# Patient Record
Sex: FEMALE | Race: WHITE | NOT HISPANIC OR LATINO | Employment: UNEMPLOYED | URBAN - METROPOLITAN AREA
[De-identification: names, ages, dates, MRNs, and addresses within clinical notes are randomized per-mention and may not be internally consistent; named-entity substitution may affect disease eponyms.]

---
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
## 2022-03-17 ENCOUNTER — TELEPHONE (OUTPATIENT)
Dept: PEDIATRICS CLINIC | Age: 22
End: 2022-03-17

## 2022-05-10 ENCOUNTER — TELEPHONE (OUTPATIENT)
Dept: PEDIATRICS CLINIC | Age: 22
End: 2022-05-10

## 2022-06-07 ENCOUNTER — TELEPHONE (OUTPATIENT)
Dept: PEDIATRICS CLINIC | Age: 22
End: 2022-06-07

## 2022-06-08 NOTE — TELEPHONE ENCOUNTER
06/08/22 1:34 PM     Thank you for your request  Your request has been received, reviewed, and the patient chart updated  The PCP has successfully been removed with a patient attribution note  This message will now be completed      Thank you  Mai Cool

## 2022-07-28 NOTE — TELEPHONE ENCOUNTER
Called first number and unable to leave message  Called second number and left message  Asked for a call back tomorrow with list of current medications, so that they would be correct when she goes to GI 
Called first number and unable to leave message  Called second number and left message to ask for call back with list of medications that patient is currently taking  Advised that leaving office shortly and asked to call back and leave list with office staff tomorrow  Albin Camargo LPN, had tried to call pharmacy earlier but they could only give a list of filled medication and did not know exactly what she was taking 
Mom called back and gave Alba Multani updated medication list of current medications  Mom reports the only changes were that she is no longer taking guanfacine (Tenex) which I discontinued on her medication list   She is taking clonazepam 0 25 mg to 0 5 mg daily as needed  I am unable to change this medication in system but added note that is taking prn 
patient

## 2022-10-14 ENCOUNTER — CONSULT (OUTPATIENT)
Dept: NEUROLOGY | Facility: CLINIC | Age: 22
End: 2022-10-14
Payer: COMMERCIAL

## 2022-10-14 VITALS
WEIGHT: 197 LBS | DIASTOLIC BLOOD PRESSURE: 74 MMHG | HEIGHT: 65 IN | HEART RATE: 83 BPM | TEMPERATURE: 96.6 F | BODY MASS INDEX: 32.82 KG/M2 | SYSTOLIC BLOOD PRESSURE: 122 MMHG

## 2022-10-14 DIAGNOSIS — R68.89 SPELLS OF DECREASED ATTENTIVENESS: Primary | ICD-10-CM

## 2022-10-14 DIAGNOSIS — R56.9 SEIZURE-LIKE ACTIVITY (HCC): ICD-10-CM

## 2022-10-14 PROCEDURE — 99245 OFF/OP CONSLTJ NEW/EST HI 55: CPT | Performed by: STUDENT IN AN ORGANIZED HEALTH CARE EDUCATION/TRAINING PROGRAM

## 2022-10-14 NOTE — ASSESSMENT & PLAN NOTE
22 y/o f w h/o Psychogenic nonepileptic spells, depression, anxiety, Tourette's syndrome, hypersomnia, OCT, bulimia, anorexia presents here as a new patient for evaluation of her spells ( seizure type episodes)  To review, spells started July 2018, about 2 episodes per day, reports warning followed by jerky movements of her body (see HPI) sometimes semiconscious during the episodes, last 1-15 min, followed by being emotional sometimes crying, mild difference in episodes in last 6 months, slightly more altered after the episodes, last 3 weeks also reports she stops breathing during some of the episodes   Reviewed ambulatory EEG report in 2021, captured 2 of her typical spells, no electrographic correlation  Recent MRI shows similar small T2 hyperintensities as in past     Saw videos of her episodes-saw jerky movement of her whole body  No tonic clonic phase, no tongue bite, urine/bowel incontinence  Not on any meds  Impression- Given mild change in semiology of her spells in last 6 months we offered routine EEG followed by possible EMU admission  Concern of psychogenic non epileptic spells with these episodes as well as she says she remains conscious during some of these episodes and they vary in semiology sometimes       Plan-  Routine EEG   Follow up in 3 months   Call with any questions or concerns   Advised to not drive

## 2022-10-14 NOTE — PROGRESS NOTES
Patient ID: Larissa Asif is a 23 y o  female with Psychogenic nonepileptic spells, depression, anxiety, Tourette's syndrome, hypersomnia, OCT, bulimia, anorexia presents here as a new patient for evaluation of her spells ( seizure type episodes)  Assessment/Plan:    Spells of decreased attentiveness   22 y/o f w h/o Psychogenic nonepileptic spells, depression, anxiety, Tourette's syndrome, hypersomnia, OCT, bulimia, anorexia presents here as a new patient for evaluation of her spells ( seizure type episodes)  To review, spells started July 2018, about 2 episodes per day, reports warning followed by jerky movements of her body (see HPI) sometimes semiconscious during the episodes, last 1-15 min, followed by being emotional sometimes crying, mild difference in episodes in last 6 months, slightly more altered after the episodes, last 3 weeks also reports she stops breathing during some of the episodes   Reviewed ambulatory EEG report in 2021, captured 2 of her typical spells, no electrographic correlation  Recent MRI shows similar small T2 hyperintensities as in past     Saw videos of her episodes-saw jerky movement of her whole body  No tonic clonic phase, no tongue bite, urine/bowel incontinence  Not on any meds  Impression- Given mild change in semiology of her spells in last 6 months we offered routine EEG followed by possible EMU admission  Concern of psychogenic non epileptic spells with these episodes as well as she says she remains conscious during some of these episodes and they vary in semiology sometimes  Plan-  Routine EEG   Follow up in 3 months   Call with any questions or concerns   Advised to not drive         She will Return in about 3 months (around 1/14/2023)        Subjective:     22 y/o f w h/o Psychogenic nonepileptic spells, depression, anxiety, Tourette's syndrome, hypersomnia, OCT, bulimia, anorexia presents here as a new patient for evaluation of her spells ( seizure type episodes)  Patient reports she gets seizure type episodes that started in July 2018  She describes them as   initial warning-  Pressure in her head, difficulty focus, slurring her words,  Zones out-  Last few seconds to a minute  then she loses consciousness,  Eyes rolls back,  sometimes her body goes backwards  Lasts 1-15 min   Post episode She gasps and shoots back again, usually oriented to time place and person when she wakes up from the episode, sometime she feels emotional, panicky, anxiety, shaking and have difficulty typing after the episode  Usually comes back to her normal self within minutes  Frequency- 2-3/week    Denies-  Tongue bite, urine incontinence, bowel incontinence during these episodes   she was evaluated at [facility],  MRI brain was done which showed-  Tiny hyperintensities,  She underwent video EEG monitoring and they were able to capture 2 of her episodes which did not correlate on EEG so diagnosed to have PNES  She followed up with psychotherapy and was put on different meds for her mood which did not help with these episodes but did help her depression and anxiety     In last 6 months ( since April)-  Episodes of slightly different,  She sometimes does not get warning, she is more conscious during these episodes, she could hear people talking, she sometimes to twitch her neck, she sometimes describes these as " convulsing" denies shaking,  Has video which looks like twitching of her whole body,  She becomes tearful, crying during these episodes after the episode she is slightly disoriented after the episode  frequency 1-2 per day, few lasts more than 5 minutes  She did injure her head 1 time and had concussion  She did have few ED visits for these episodes       In last 3 weeks she describes similar episodes but associated with decreased breathing, she says she stops breathing during these episodes, she had 1 episode when she also had increase in her blood pressure during these episodes  Most recent episode- last night   Repeat MRI brain-  Shows T2 hyperintensities  No EEG  also completed Sleep study to rule out narcolepsy? diagnosed with Hypersomnia,  Prescribed midafinil  Stopped 6 months ago d/t side effects  Current seizure medications:  1   none  Other medications as per Epic    Event/Seizure semiology:   As above- gets warning, pressure in her head, slurring of words, zones out,  becomes semiconscious, sometimes aware what is going on however unable to formulate, sometimes loses consciousness, generalized jerking movement of her body, lasts few min, mild orientation sometimes  Recently in last 3 weeks she also some breathing issues where sometimes she stops breathing during these episodes  Special Features  Status epilepticus: no longerst 15 min   Self Injury Seizures: yes- convulsion   Precipitating Factors: lights, sounds, repeated lights, changes in temp, used to have stress     Epilepsy Risk Factors:  Abnormal pregnancy:    Her mom was told- possibly Down syndrome when pregnant but didn't have any chromosomal anomaly when she was born  Abnormal birth/:   no  Abnormal Development:   no  Febrile seizures, simple:   no  Febrile seizures, complex:   no  CNS infection:    no  Intellectual disability:    no  Cerebral palsy:    no  Head injury (moderate/severe):  no  CNS neoplasm:    no  CNS malformation:    no  Neurosurgical procedure:   no  Stroke:     no  Alcohol abuse:    nonone   Drug abuse:     no none   Family history Sz/epilepsy:   no no   Prior physical/sexual/emotional abuse: no    Prior AEDs:  =None      Prior Evaluation:  - MRI brain: 22- Several small foci of cerebral white matter signal abnormality which are   nonspecific in a patient of this age group and are therefore of uncertain   etiology and clinical significance   This finding has been reported in the   setting of chronic migraine headaches    - Routine EEG: unable to see the results     - Ambulatory EEG: Monitoring Period Start: 10/19/2021    Monitoring Period End: 10/20/2021    Overall Final Impression: Normal     This is a normal ambulatory EEG  Several episodes of eye fluttering associated episodes did not have clear epileptiform discharges  - Video EEG: none   - PET scan brain : none   - Neuropsychologic testing: none     Psychiatric History:  Depression: yes- diagnosed 17 y/2- tried zoloft, abilify, clonazepam, lexapro, stopped-  April 2nd 2022- didn't help her spells  Anxiety: JEN- diagnosis  17 y/o   Psychotherapies- June 2022- helpful  No time  Psychosis:  none  Psychiatric Admissions: none   OCD- diagnosed age-13- touch/balance  Saying things 3 times   SI- attmpted- twice- strangled her self, one time tried to cut her wrist     Medications-  None at this time  Social- full time student, [employer], [employer]  Lives with room mates, no neuro deficits at baseline  allerigies- latex, nuts   Doesn't drive  Her history was also obtained from her and her significant other in room  The following portions of the patient's history were reviewed and updated as appropriate: allergies, current medications, past family history, past medical history, past social history, past surgical history and problem list      Objective:    Blood pressure 122/74, pulse 83, temperature (!) 96 6 °F (35 9 °C), height 5' 5" (1 651 m), weight 89 4 kg (197 lb)  Physical Exam  Vitals reviewed  Constitutional:       Appearance: Normal appearance  HENT:      Head: Normocephalic  Mouth/Throat:      Mouth: Mucous membranes are moist       Pharynx: Oropharynx is clear  Eyes:      Extraocular Movements: Extraocular movements intact  Pupils: Pupils are equal, round, and reactive to light  Cardiovascular:      Rate and Rhythm: Normal rate  Pulses: Normal pulses  Pulmonary:      Effort: Pulmonary effort is normal    Abdominal:      Palpations: Abdomen is soft  Musculoskeletal:         General: Normal range of motion  Cervical back: Normal range of motion  Skin:     General: Skin is warm  Capillary Refill: Capillary refill takes less than 2 seconds  Neurological:      Mental Status: She is alert  Psychiatric:         Mood and Affect: Mood normal        Neurological Examination:     Mental Status: The patient was awake, alert, attentive, oriented to person, place, and time  Recent and remote memory intact to conversation with no evidence of language dysfunction  Cranial Nerves:   I: smell Not tested   II: visual fields Full to confrontation  Pupils equal, round, reactive to light with normal accomodation  Fundus: benign fundus  III,IV,VI: extraocular muscles EOMI, no nystagmus   V: masseter and pterygoid strength full  Sensation in the V1 through V3 distributions intact to pinprick and light touch bilaterally  VII: Face is symmetric with no weakness noted  VIII: Audition intact to finger rub bilaterally  IX/X: Uvula midline  Soft palate elevation symmetric  XI: Trapezius and SCM strength 5/5 B/L  XII: Tongue midline with no atrophy or fasciculations with appropriate movement  Motor Examination:   No pronator drift  Bulk: Normal  No atrophy Tone: Normal  Fasciculations: None        Deltoid Biceps Triceps WE   WF   FF IO     Right        5         5          5         5      5      5   5        Left           5        5          5          5      5     5   5                       IP        Quad   Ham     TA       Gastroc   Right      5            5          5         5                5  Left         5            5         5         5                5       Reflexes:                   Biceps Brachioradialis Triceps Patella Achilles Plantars   Right          2+            2+                  2+        2+       2+         Down   Left            2+             2+                 2+         2+       2+         Down     Clonus: None    Pathological Reflexes:  Hoffmans: negative  Babinsky: negative  Jaw Jerk: negative    Coordination: Patient able to perform normal finger-to-nose and heel to shin appropriately  Normal rapid alternating movements  Sensory: Normal sensation to light touch, pin prick and vibratory sensation throughout  Gait:normal stance and posture, normal stride length and arm swing, normal turn around  ROS:    Review of Systems   Constitutional: Negative  Negative for appetite change and fever  HENT: Negative  Negative for hearing loss, tinnitus, trouble swallowing and voice change  Eyes: Negative  Negative for photophobia, pain and visual disturbance  Respiratory: Positive for shortness of breath  Cardiovascular: Negative  Negative for palpitations  Gastrointestinal: Positive for nausea and vomiting  Endocrine: Negative  Negative for cold intolerance  Genitourinary: Negative  Negative for dysuria, frequency and urgency  Musculoskeletal: Negative  Negative for gait problem, myalgias and neck pain  Skin: Negative  Negative for rash  Allergic/Immunologic: Negative  Neurological: Positive for dizziness, tremors, seizures, speech difficulty, weakness, light-headedness and headaches  Negative for syncope, facial asymmetry and numbness  Hematological: Negative  Does not bruise/bleed easily  Psychiatric/Behavioral: Positive for confusion  Negative for hallucinations and sleep disturbance  Memory   All other systems reviewed and are negative      I personally reviewed the ROS that was entered by the medical assistant

## 2022-10-14 NOTE — PROGRESS NOTES
Review of Systems   Constitutional: Negative  Negative for appetite change and fever  HENT: Negative  Negative for hearing loss, tinnitus, trouble swallowing and voice change  Eyes: Negative  Negative for photophobia, pain and visual disturbance  Respiratory: Positive for shortness of breath  Cardiovascular: Negative  Negative for palpitations  Gastrointestinal: Positive for nausea and vomiting  Endocrine: Negative  Negative for cold intolerance  Genitourinary: Negative  Negative for dysuria, frequency and urgency  Musculoskeletal: Negative  Negative for gait problem, myalgias and neck pain  Skin: Negative  Negative for rash  Allergic/Immunologic: Negative  Neurological: Positive for dizziness, tremors, seizures, speech difficulty, weakness, light-headedness and headaches  Negative for syncope, facial asymmetry and numbness  Hematological: Negative  Does not bruise/bleed easily  Psychiatric/Behavioral: Positive for confusion  Negative for hallucinations and sleep disturbance  Memory   All other systems reviewed and are negative

## 2022-11-17 ENCOUNTER — HOSPITAL ENCOUNTER (OUTPATIENT)
Dept: NEUROLOGY | Facility: HOSPITAL | Age: 22
End: 2022-11-17

## 2022-11-17 DIAGNOSIS — R68.89 SPELLS OF DECREASED ATTENTIVENESS: ICD-10-CM

## 2022-11-21 ENCOUNTER — TELEPHONE (OUTPATIENT)
Dept: NEUROLOGY | Facility: CLINIC | Age: 22
End: 2022-11-21

## 2022-11-21 DIAGNOSIS — R56.9 SEIZURE-LIKE ACTIVITY (HCC): Primary | ICD-10-CM

## 2022-11-21 NOTE — TELEPHONE ENCOUNTER
----- Message from Russell Negrete sent at 11/21/2022  7:46 AM EST -----  Regarding: FW: EMU Scheduling  Contact: [phone]    ----- Message -----  From: Shaniqua Parker  Sent: 11/18/2022   6:22 PM EST  To: Neurology [facility]  Subject: Eber Mood Dr Yayo Latif,    I had my basic EEG done yesterday at the [facility] location  The results cane back "abnormal "  How and when can I schedule my visit with the EMU?     Please and thank you,  Linden Velazquez

## 2022-11-25 NOTE — TELEPHONE ENCOUNTER
Called pt to schedule EMU admission  EMU admission scheduled for 12/19/2022 at 09:00 with Dr Beth Morales attending  Education sheet reviewed with pt and emailed to her  Pt added to outlook calendar  ADT21 Order placed   Encounter forwarded to ordering physician and Pre-cert team

## 2022-11-28 NOTE — TELEPHONE ENCOUNTER
Call placed to patient  Unable to move EMU admission to week of 12/12 as she will still be in school

## 2022-12-19 ENCOUNTER — HOSPITAL ENCOUNTER (INPATIENT)
Facility: HOSPITAL | Age: 22
LOS: 2 days | Discharge: HOME | End: 2022-12-21
Attending: PSYCHIATRY & NEUROLOGY | Admitting: PSYCHIATRY & NEUROLOGY

## 2022-12-19 ENCOUNTER — APPOINTMENT (OUTPATIENT)
Dept: NEUROLOGY | Facility: CLINIC | Age: 22
End: 2022-12-19

## 2022-12-19 DIAGNOSIS — F44.5 FUNCTIONAL NEUROLOGICAL SYMPTOM DISORDER WITH ATTACKS OR SEIZURES: Primary | ICD-10-CM

## 2022-12-19 DIAGNOSIS — F44.5 PSYCHOGENIC NONEPILEPTIC SEIZURE: ICD-10-CM

## 2022-12-19 DIAGNOSIS — F42.2 MIXED OBSESSIONAL THOUGHTS AND ACTS: ICD-10-CM

## 2022-12-19 PROBLEM — F50.81 BINGE EATING DISORDER: Status: ACTIVE | Noted: 2022-09-02

## 2022-12-19 PROBLEM — R68.89 SPELLS OF DECREASED ATTENTIVENESS: Status: RESOLVED | Noted: 2022-10-14 | Resolved: 2022-12-19

## 2022-12-19 LAB
ALBUMIN SERPL BCP-MCNC: 3.9 G/DL (ref 3.5–5)
ALP SERPL-CCNC: 58 U/L (ref 46–116)
ALT SERPL W P-5'-P-CCNC: 29 U/L (ref 12–78)
ANION GAP SERPL CALCULATED.3IONS-SCNC: 4 MMOL/L (ref 4–13)
AST SERPL W P-5'-P-CCNC: 17 U/L (ref 5–45)
ATRIAL RATE: 72 BPM
BASOPHILS # BLD AUTO: 0.03 THOUSANDS/ÂΜL (ref 0–0.1)
BASOPHILS NFR BLD AUTO: 0 % (ref 0–1)
BILIRUB SERPL-MCNC: 0.47 MG/DL (ref 0.2–1)
BUN SERPL-MCNC: 11 MG/DL (ref 5–25)
CALCIUM SERPL-MCNC: 9.5 MG/DL (ref 8.3–10.1)
CHLORIDE SERPL-SCNC: 108 MMOL/L (ref 96–108)
CO2 SERPL-SCNC: 26 MMOL/L (ref 21–32)
CREAT SERPL-MCNC: 0.8 MG/DL (ref 0.6–1.3)
EOSINOPHIL # BLD AUTO: 0.03 THOUSAND/ÂΜL (ref 0–0.61)
EOSINOPHIL NFR BLD AUTO: 0 % (ref 0–6)
ERYTHROCYTE [DISTWIDTH] IN BLOOD BY AUTOMATED COUNT: 11.3 % (ref 11.6–15.1)
GFR SERPL CREATININE-BSD FRML MDRD: 106 ML/MIN/1.73SQ M
GLUCOSE SERPL-MCNC: 90 MG/DL (ref 65–140)
HCG SERPL QL: NEGATIVE
HCT VFR BLD AUTO: 40.5 % (ref 34.8–46.1)
HGB BLD-MCNC: 13.5 G/DL (ref 11.5–15.4)
IMM GRANULOCYTES # BLD AUTO: 0.03 THOUSAND/UL (ref 0–0.2)
IMM GRANULOCYTES NFR BLD AUTO: 0 % (ref 0–2)
LYMPHOCYTES # BLD AUTO: 1.51 THOUSANDS/ÂΜL (ref 0.6–4.47)
LYMPHOCYTES NFR BLD AUTO: 22 % (ref 14–44)
MCH RBC QN AUTO: 29.9 PG (ref 26.8–34.3)
MCHC RBC AUTO-ENTMCNC: 33.3 G/DL (ref 31.4–37.4)
MCV RBC AUTO: 90 FL (ref 82–98)
MONOCYTES # BLD AUTO: 0.41 THOUSAND/ÂΜL (ref 0.17–1.22)
MONOCYTES NFR BLD AUTO: 6 % (ref 4–12)
NEUTROPHILS # BLD AUTO: 4.83 THOUSANDS/ÂΜL (ref 1.85–7.62)
NEUTS SEG NFR BLD AUTO: 72 % (ref 43–75)
NRBC BLD AUTO-RTO: 0 /100 WBCS
P AXIS: 20 DEGREES
PLATELET # BLD AUTO: 335 THOUSANDS/UL (ref 149–390)
PMV BLD AUTO: 9.2 FL (ref 8.9–12.7)
POTASSIUM SERPL-SCNC: 4.3 MMOL/L (ref 3.5–5.3)
PR INTERVAL: 118 MS
PROT SERPL-MCNC: 7.9 G/DL (ref 6.4–8.4)
QRS AXIS: 63 DEGREES
QRSD INTERVAL: 88 MS
QT INTERVAL: 384 MS
QTC INTERVAL: 420 MS
RBC # BLD AUTO: 4.51 MILLION/UL (ref 3.81–5.12)
SODIUM SERPL-SCNC: 138 MMOL/L (ref 135–147)
T WAVE AXIS: 51 DEGREES
VENTRICULAR RATE: 72 BPM
WBC # BLD AUTO: 6.84 THOUSAND/UL (ref 4.31–10.16)

## 2022-12-19 RX ORDER — ETONOGESTREL/ETHINYL ESTRADIOL .12-.015MG
RING, VAGINAL VAGINAL
COMMUNITY
Start: 2022-10-26

## 2022-12-19 RX ORDER — LORAZEPAM 2 MG/ML
2 INJECTION INTRAMUSCULAR EVERY 8 HOURS PRN
Status: DISCONTINUED | OUTPATIENT
Start: 2022-12-19 | End: 2022-12-21 | Stop reason: HOSPADM

## 2022-12-19 RX ORDER — ONDANSETRON 2 MG/ML
4 INJECTION INTRAMUSCULAR; INTRAVENOUS EVERY 6 HOURS PRN
Status: DISCONTINUED | OUTPATIENT
Start: 2022-12-19 | End: 2022-12-21 | Stop reason: HOSPADM

## 2022-12-19 RX ADMIN — INFLUENZA VIRUS VACCINE 0.5 ML: 15; 15; 15; 15 SUSPENSION INTRAMUSCULAR at 12:58

## 2022-12-19 RX ADMIN — IBUPROFEN, ACETAMINOPHEN 2 TABLET: 125; 250 TABLET, FILM COATED ORAL at 19:39

## 2022-12-19 NOTE — H&P
Neurology/Epilepsy Admission Note  Patient Name:  Corin Espinoza  : 2000  MRN: [id number]  Encounter: [id number]  Date: 22  Bed / Unit: [facility] 715/[facility] 715-01    Referring Provider:  Royce Betancur MD    Chief Complaint: daily events concerning for seizures / loss of awareness with abnormal movements  HPI:   Ms Corin Espinoza is a 23 y o  right handed woman referred to the epilepsy monitoring unit to evaluate for recurrent episodes with seizure-like activity  These events involve loss of consciousness with upper body twitching, jerking, or muscle spasm type of activity  She was previously diagnosed with PNES events in 2018 at [facility]  She was having episodes of short spells of unconsciousness, less than a minute, there was no convulsive activity, but she had no warning to these events  She did not complete an epilepsy monitoring unit at the time  She had an ambulatory EEG study done  Later, the PNES diagnosis was removed because the doctors thought maybe she had narcolepsy instead  She had a sleep study that did not confirm narcolepsy or cataplexy, but she had hypersomnolence  For the past year, maybe starting in the  of , her events changed, have become more prolonged and she has some degree of consciousness  These events start with a change in her speech (word finding difficulty, slurred speech, or slowness in speech), then she feels paralyzed unable to respond, or she looks like she is staring, then she has upper body (neck and shoulders) muscle spasms, neck arches or flexes forward, shoulder seems to be twitching, these muscle spasms are so severe she cannot breath, she may let out a gasp just to take a breath  She is sometimes conscious  These events last 4-10 minutes  There is no movement of her legs or hands  After she is done with these events, she has a severe headache and muscle pain around her neck and shoulders   She has to take a specific ibuprofen 125mg-acetaminophen 250mg tab 2 tabs after the event because of the severity of her headache and muscle pain  These events happen about twice a day, mostly around 1PM to 6PM   These can be triggered by flashing lights, rapid temperature changes, or if she is exposed to too much auditory stimuli  She had an EEG study that showed abnormal bilateral frontotemporal theta activity  Current AEDs:  none    Event/Seizure semiology:  1  Altered speech, neck arching/flexing, shoulder spasms or twitching, eyes rolling up, difficulty breathing, altered awareness    Special Features  Status epilepticus: No  Self Injury Seizures: No  Precipitating Factors: flashing lights, too much auditory stimulation  Post-ictal State: headache and muscle pain    Seizure Risk Factors:  Abnormal pregnancy: No  Abnormal birth/: No  Abnormal Development: No  Febrile seizures, simple: No  Febrile seizures, complex: No  CNS infection: No  Intellectual disability: No  Cerebral palsy: No  Head injury (moderate/severe): No  CNS neoplasm: No  CNS malformation: No  Neurosurgical procedure: No  Stroke: No  Alcohol abuse: No  Drug abuse: No  Family history Sz/epilepsy: No    Prior AEDs:  none    Past Medical/Surgical History:  Past Medical History:   Diagnosis Date   • Allergic rhinitis    • Childhood asthma    • Eczema    • Folliculitis    • Functional murmur    • GERD (gastroesophageal reflux disease)    • Parapsoriasis    • Seizures ([facility] [address] )     [facility] 2018 - PNES   • Tourette's      Past Surgical History:   Procedure Laterality Date   • SKIN LESION EXCISION      removal of lesion       Psychiatric History:  Depression: Yes  Anxiety: Yes, OCD (feels like her skin is crawling or something is on it, bulimia; history of cutting behavior  Psychosis: No  Psychiatric Admissions: No    Current Medications (not AEDs): Ibuprofen 125mg-acetaminophen 250mg PRN headache/muscle pain  Nuvaring    Allergies:   Allergies   Allergen Reactions   • Treenut [Nuts - Food Allergy] Anaphylaxis   • Latex Rash and Blisters       Family history:  Family History   Problem Relation Age of Onset   • Allergies Mother         3-quinolones allergy and allergic to sulfa drugs and allergic reaction to penicillin   • Asthma Mother    • Migraines Mother    • Hypertension Mother    • Spondylolysis Mother    • OCD Mother    • Hypertension Father    • Allergies Father         nuts, tomato, apples, banana, kiwi, pears, peaches   • Asthma Father    • Hyperlipidemia Father    • Esophagitis Father         Eosinophilic   • Migraines Brother    • Asthma Brother    • Other Brother         hypoglycemia   • Hyperlipidemia Maternal Grandmother    • Hypertension Maternal Grandmother    • Migraines Maternal Grandmother    • Gallbladder disease Maternal Grandmother         removed gall bladder   • Hypertension Maternal Grandfather    • Arthritis Maternal Grandfather    • Hypertension Paternal Grandmother    • Hypertension Paternal Grandfather    • Depression Paternal Grandfather    • Pancreatic cancer Paternal Grandfather    • Heart attack Paternal Grandfather    • Gallbladder disease Maternal Aunt    • Ulcers Maternal Aunt         peptic ulcer   • Thyroid disease Family         MGGM   • Alcohol abuse Neg Hx    • Substance Abuse Neg Hx        Social History:  College student; vocal major  Tobacco:  No tobacco use   Alcohol:  No alcohol use      Drugs:  No illegal drug use    Review of Systems  ROS:  (Extended=2-9; brief = 1)    Constitutional: negative  Eyes: negative  Ears, nose, mouth, throat, and face: sore throat / hoarseness after signing  Respiratory: negative  Cardiovascular: negative  Gastrointestinal: negative  Genitourinary:negative  Integument/breast: negative  Hematologic/lymphatic: positive for easy bruising  Musculoskeletal:both her knees have joint pain, it has been there all her life  Neurological: negative  Behavioral/Psych: positive for anxiety, irritability and anxiety and tics    Physical Exam:  Blood pressure 117/77, pulse 84, temperature (!) 97 3 °F (36 3 °C), temperature source Oral, resp  rate 16, height 5' 3" (1 6 m), weight 88 9 kg (195 lb 15 8 oz), SpO2 96 %     Appearance: normally developed, appears well  Cardiovascular: regular rate and rhythm and normal heart sounds  Pulmonary: clear to auscultation  Abdominal: soft, obese  Extremities: no edema    Carotids: No bruits  HEENT: anicteric and moist mucus membranes / oral cavity   Fundoscopy: normal    Mental status  Orientation: alert and oriented to name, place, time  Fund of Knowledge: intact   Attention and Concentration: intact  Current and Remote Memory:intact  Language: spontaneous speech is normal and comprehension is intact    Cranial Nerves  CN 1: not tested  CN 2: Visual fields intact to confrontation and pupils equal round reactive to direct and consenual light   CN 3, 4, 6: EOMI, no nystagmus  CN 5:sensation intact to all distribution V1, V2, V3  CN 7:muscles of facial expression are symmetric  CN 8:symmetric to finger rubs bilaterally  CN 9, 10:symmetric elevation of soft palate and uvula is midline  CN 11:symmetric strength of sternocleidomastoid and trapezius muscles  CN 12:tongue is midline    Motor:  Bulk, Tone: normal bulk, normal tone  Pronation: no pronator drift  Strength: Patient has full strength symmetrically of shoulder abduction, biceps, triceps, wrist flexion, wrist extension, finger flexion, finger abduction, hip flexion, knee flexion, knee extension, dorsiflexion  Abnormal movements: very infrequent facial tic, grimacing movements    Sensory:  Lighttouch: intact in all limbs  Romberg:normal    Coordination:  FNF:FNF bilaterally intact  JOHN:intact  FFM:intact  Gait/Station:normal gait and normal tandem gait    Reflexes:  reflexes are uniformly hyporeflexic    [x] I personally reviewed the radiological images  Labs:  10/6/2022   /4 2/106/24/10/0 8/97  CBC  8 7/13 7/423/398    Radiology:  9/7/2022 - MRI brain   On my interpretation  This is mostly a normal MR brain study with symmetric hippocampal formations, no abnormal signal   There are no cortical lesions  There are several small punctate T2 hyperintensities in the subcortical white matter    Prior EEGs:  11/17/2022 - (Dr Cliff Murillo)  "dysrhythmic activities in the theta frequencies emanating from the frontotemporal head regions bilaterally"  During photic stimulation patient had a "spell" (loss of consciousness); no abnormal electroencephalographic activity was noted       Assessment:  23 y o  female admitted to the epilepsy monitoring unit for differential diagnosis of recurrent episodes with seizure-like activity including change in speech, altered awareness, upper body tonic spasms that last for 4-10 minutes at a time  These events can be epileptic focal hypermotor seizures or nonepileptic psychogenic events  Focal hyperkinetic motor seizures are often difficult to diagnosis due to unusual behaviors and continuous video EEG monitoring is necessary for these type of seizures, however, psychogenic nonepileptic seizures can also mimic epileptic seizures  We need to complete continuous video EEG monitoring study as an inpatient as if these are seizures, then we would need to start antiseizure medications and seizure safety measures  She has Tourette's syndrome, with facial tics and OCD symptoms  She also has underlying generalized anxiety, depression and history of self cutting behaviors that can cause her to have functional neurological symptoms (psychogenic nonepileptic seizures)        PLAN:  Spells/Fit NOS, Seizures R56 9  start continuous video EEG monitoring with single lead ECG  EMU protocol for seizure safety, fall precautions, ictal cognitive testing, and acute seizure care with vital signs  DVT prophylaxis: ambulation, Venodynes  Seizure rescue: lorazepam 2mg IV if there is a generalized convulsion or more than 2 complex partial seizures within 12 hours Headache/muscle pain (post event)  - ibuprofen 125mg - acetaminophen 250mg take 2 tabs q8hrs PRN post event headache/muscle pain    Diet  - regular diet    Flu vaccine - she requested a flu vaccine during this admission  FULL CODE       [x]  The patient will be admitted for at least 2 midnights for the evaluation of seizures / seizure-like activity  The process for admission to the Epilepsy Monitoring Unit (EMU) was discussed with the patient at length  I explained to her that she would be admitted to the EMU in order to better characterize and localized the events s/he is having  I also explained that, during her EMU admission, medications will be tapered, patients are sleep deprived, and undergo other induction procedures (including hyperventilation, photic stimulation, exercise) in order to induce a seizure  This will allow me to safely gain additional information about the precise nature and localization of his/her events  I also explained to the patient that, although the average EMU stay is approximately 3-7 days, the length of stay may be shorter or longer, depending on the time needed to induce a seizure  Patients generally will have a peripheral IV, may be on telemetry, and have DVT prophylaxis that may include heparin or low-molecular weight heparin injections and venodynes  During the EMU, patients will be on continuous video EEG monitoring which may result in skin breakdown from the electrodes and chemicals used to keep the electrodes in place (usually treatment with antibacterial ointment is sufficient), and be restricted to the bed/room for prolonged periods of time  Due to the induction of seizures, the EMU is the best setting to offer safety and management of acute seizures, which cannot be monitored from the home setting    Risk of inducing seizures include injuries, death, recurrent seizures including status epilepticus and the requirement of rescue medications including use of anesthesia and mechanical ventilation to abort recurrent seizures  [x]  The patient is having medically uncontrolled seizures or clinical events, which poses a significant mortality and morbidity risk  Continuous monitoring will aid in diagnosis and determining best treatment options  If seizures are left untreated, there is a 10-20% annual morbidity and 2% annual mortality rate  Continuous monitoring is required to capture a sufficient number of clinical events or seizures, while medication are reduced or provoked, which increases the risk of severe seizures and at risk of seizures causing injury or status epilepticus  The EMU is set up to monitor and record EEGs during seizures and it includes nurses trained to provide first aid and patient safety during seizures  The full hospital team must be present to deal with these medical emergency situations should they arise, including nurses to document patient’s exam  There have been 13 reported deaths during various video-EEG monitoring procedures, so this is not a "low risk" service

## 2022-12-19 NOTE — PLAN OF CARE
Problem: PAIN - ADULT  Goal: Verbalizes/displays adequate comfort level or baseline comfort level  Description: Interventions:  - Encourage patient to monitor pain and request assistance  - Assess pain using appropriate pain scale  - Administer analgesics based on type and severity of pain and evaluate response  - Implement non-pharmacological measures as appropriate and evaluate response  - Consider cultural and social influences on pain and pain management  - Notify physician/advanced practitioner if interventions unsuccessful or patient reports new pain  Outcome: Progressing     Problem: INFECTION - ADULT  Goal: Absence or prevention of progression during hospitalization  Description: INTERVENTIONS:  - Assess and monitor for signs and symptoms of infection  - Monitor lab/diagnostic results  - Monitor all insertion sites, i e  indwelling lines, tubes, and drains  - Monitor endotracheal if appropriate and nasal secretions for changes in amount and color  - Minerva appropriate cooling/warming therapies per order  - Administer medications as ordered  - Instruct and encourage patient and family to use good hand hygiene technique  - Identify and instruct in appropriate isolation precautions for identified infection/condition  Outcome: Progressing  Goal: Absence of fever/infection during neutropenic period  Description: INTERVENTIONS:  - Monitor WBC    Outcome: Progressing     Problem: SAFETY ADULT  Goal: Patient will remain free of falls  Description: INTERVENTIONS:  - Educate patient/family on patient safety including physical limitations  - Instruct patient to call for assistance with activity   - Consult OT/PT to assist with strengthening/mobility   - Keep Call bell within reach  - Keep bed low and locked with side rails adjusted as appropriate  - Keep care items and personal belongings within reach  - Initiate and maintain comfort rounds  - Make Fall Risk Sign visible to staff  - Offer Toileting every  Hours, in advance of need  - Initiate/Maintain alarm  - Obtain necessary fall risk management equipment:   - Apply yellow socks and bracelet for high fall risk patients  - Consider moving patient to room near nurses station  Outcome: Progressing  Goal: Maintain or return to baseline ADL function  Description: INTERVENTIONS:  -  Assess patient's ability to carry out ADLs; assess patient's baseline for ADL function and identify physical deficits which impact ability to perform ADLs (bathing, care of mouth/teeth, toileting, grooming, dressing, etc )  - Assess/evaluate cause of self-care deficits   - Assess range of motion  - Assess patient's mobility; develop plan if impaired  - Assess patient's need for assistive devices and provide as appropriate  - Encourage maximum independence but intervene and supervise when necessary  - Involve family in performance of ADLs  - Assess for home care needs following discharge   - Consider OT consult to assist with ADL evaluation and planning for discharge  - Provide patient education as appropriate  Outcome: Progressing  Goal: Maintains/Returns to pre admission functional level  Description: INTERVENTIONS:  - Perform BMAT or MOVE assessment daily    - Set and communicate daily mobility goal to care team and patient/family/caregiver  - Collaborate with rehabilitation services on mobility goals if consulted  - Perform Range of Motion  times a day  - Reposition patient every  hours    - Dangle patient  times a day  - Stand patient times a day  - Ambulate patient  times a day  - Out of bed to chair  times a day   - Out of bed for meals times a day  - Out of bed for toileting  - Record patient progress and toleration of activity level   Outcome: Progressing     Problem: DISCHARGE PLANNING  Goal: Discharge to home or other facility with appropriate resources  Description: INTERVENTIONS:  - Identify barriers to discharge w/patient and caregiver  - Arrange for needed discharge resources and transportation as appropriate  - Identify discharge learning needs (meds, wound care, etc )  - Arrange for interpretive services to assist at discharge as needed  - Refer to Case Management Department for coordinating discharge planning if the patient needs post-hospital services based on physician/advanced practitioner order or complex needs related to functional status, cognitive ability, or social support system  Outcome: Progressing     Problem: Knowledge Deficit  Goal: Patient/family/caregiver demonstrates understanding of disease process, treatment plan, medications, and discharge instructions  Description: Complete learning assessment and assess knowledge base    Interventions:  - Provide teaching at level of understanding  - Provide teaching via preferred learning methods  Outcome: Progressing     Problem: NEUROSENSORY - ADULT  Goal: Achieves stable or improved neurological status  Description: INTERVENTIONS  - Monitor and report changes in neurological status  - Monitor vital signs such as temperature, blood pressure, glucose, and any other labs ordered   - Initiate measures to prevent increased intracranial pressure  - Monitor for seizure activity and implement precautions if appropriate      Outcome: Progressing  Goal: Remains free of injury related to seizures activity  Description: INTERVENTIONS  - Maintain airway, patient safety  and administer oxygen as ordered  - Monitor patient for seizure activity, document and report duration and description of seizure to physician/advanced practitioner  - If seizure occurs,  ensure patient safety during seizure  - Reorient patient post seizure  - Seizure pads on all 4 side rails  - Instruct patient/family to notify RN of any seizure activity including if an aura is experienced  - Instruct patient/family to call for assistance with activity based on nursing assessment  - Administer anti-seizure medications if ordered    Outcome: Progressing  Goal: Achieves maximal functionality and self care  Description: INTERVENTIONS  - Monitor swallowing and airway patency with patient fatigue and changes in neurological status  - Encourage and assist patient to increase activity and self care     - Encourage visually impaired, hearing impaired and aphasic patients to use assistive/communication devices  Outcome: Progressing

## 2022-12-20 ENCOUNTER — APPOINTMENT (OUTPATIENT)
Dept: NEUROLOGY | Facility: CLINIC | Age: 22
End: 2022-12-20

## 2022-12-20 PROCEDURE — 4A10X4Z MONITORING OF CENTRAL NERVOUS ELECTRICAL ACTIVITY, EXTERNAL APPROACH: ICD-10-PCS | Performed by: PSYCHIATRY & NEUROLOGY

## 2022-12-20 RX ADMIN — IBUPROFEN, ACETAMINOPHEN 2 TABLET: 125; 250 TABLET, FILM COATED ORAL at 16:23

## 2022-12-20 NOTE — PROGRESS NOTES
Neurology/Epilepsy Progress Note  Patient Name:  Adrien Ann  : 2000  MRN: [id number]  Encounter: [id number]  Date of admission: 2022  Date: 22  Bed / Unit: [facility] 715/[facility] 715-01    CC:  Admission to EMU for differential diagnosis of seizure like events    Assessment:  Ms Adrien Ann is a 23 y o  woman admitted to the EMU for differential diagnosis of recurrent episodes of loss of consciousness with seizure like activity, including altered awareness, eyes fluttering, upper body jerking activity or tonic spasms  There has been no generalized or bilateral tonic clonic activity or shaking activity involving her legs or hands (body twitching is more localized to the shoulders and head region)  Prior EEG studies were abnormal due to the presence of sharply contoured "dysrhythmic" activity over the bilateral frontotemporal regions  The two events yesterday are consistent with psychogenic nonepileptic events  I reviewed with the patient the diagnosis of psychogenic nonepileptic events, these events that she experiences are not due to abnormal electrical activity of the brain to cause seizures  Sometimes seizures cannot be seen on EEG alone, so the video component is necessary  The movements of her body and unresponsive state with normal EEG are typically seen in PNES and not with epileptic seizures  Due to the presence of the "abnormal" "dysrhythmic" activity over the temporal regions of the brain, I recommended another 24 hours of video EEG monitoring to assess if these evolve to seizures  This pattern is listed as "abnormal" as they are not a normal feature of an adult EEG (with beta, alpha activity)  But this finding ("dysrhythmic frontotemporal activity") is a variation of unknown clinical significance and seen in a variety of settings and is not a diagnostic marker of any neurological or psychiatric condition        PLAN:  Seizure / Spell evaluation:  continue continuous video EEG monitoring with single lead ECG  EMU protocol for seizure safety, fall precautions, ictal cognitive testing, and acute seizure care with vital signs  Hyperventilation today  Photic stimulation today  DVT prophylaxis: ambulation and venodynes  Seizure rescue: lorazepam 2mg IV if there is a generalized convulsion or more than 2 complex partial seizures within 12 hours     [x] Medical issues:  Headache/muscle pain (post event)  - ibuprofen 125mg - acetaminophen 250mg take 2 tabs q8hrs PRN post event headache/muscle pain     Diet  - regular diet    FULL CODE     Interval History:   Yesterday, there were two typical events of loss of consciousness, impaired speech, and body jerking activity  She recalled that she had tunnel vision with these events and difficulty with her concentration  She recalled being told that her EEGs have always been abnormal and wondered what does that mean with respect to her nonepileptic events  She has no memory of her childhood prior to 17years old  She denies a history of physical or sexual abuse  No one has told her that she was abused when she was younger  She was told that she has PTSD "markers" but she denies any traumatic life event  She reports that she is unable to dream   She reports that she has tried talk therapy (CBT) and antidepressant medications when she was initially diagnosed with PNES but her events persisted and changed      Current AEDs:  none    Additional Past Medical/Surgical History:  No additional medical history is provided by the patient and is unchanged from the initial H+P    PFSHx:  [x] Family and Social history is unchanged from HPI Note    Current Medications:  Current Facility-Administered Medications   Medication Dose Route Frequency Provider Last Rate   • Ibuprofen-Acetaminophen  2 tablet Oral Q8H PRN Minal Darby MD     • LORazepam  2 mg Intravenous Q8H PRN Minal Darby MD     • ondansetron  4 mg Intravenous Q6H PRN Minal Darby MD PRN medications:   •  Ibuprofen-Acetaminophen  •  LORazepam  •  ondansetron    Allergies: Allergies   Allergen Reactions   • Treenut [Nuts - Food Allergy] Anaphylaxis   • Latex Rash and Blisters     Review of Systems  ROS:  (Extended=2-9; brief = 1)    Constitutional: negative  Eyes: negative  Ears, nose, mouth, throat, and face: negative  Respiratory: negative  Cardiovascular: negative  Gastrointestinal: negative  Genitourinary:negative  Integument/breast: negative  Musculoskeletal:negative  Neurological: negative  Behavioral/Psych: negative    Physical Exam:  EXAMINATION   (any 12=Level 3; any 6=Level 2; <6 = Level 1)  Elaborate all abnormal findings  General exam   Blood pressure 116/74, pulse 72, temperature (!) 97 3 °F (36 3 °C), resp  rate 16, height 5' 3" (1 6 m), weight 88 9 kg (195 lb 15 8 oz), SpO2 96 %    Appearance: normally developed, appears well  Cardiovascular: regular rate and rhythm and normal heart sounds  Pulmonary: clear to auscultation  Abdominal: soft, nontender  Extremities: no edema    HEENT: anicteric and moist mucus membranes / oral cavity     Mental status  Orientation: alert and oriented to name, place, time  Fund of Knowledge: intact   Attention and Concentration: able to perform serial 7s  Current and Remote Memory:intact  Language: spontaneous speech is normal and comprehension is intact    Cranial Nerves  CN 3, 4, 6: EOMI, no nystagmus  CN 5:sensation intact to all distribution V1, V2, V3  CN 7:muscles of facial expression are symmetric  CN 8:symmetric to finger rubs bilaterally  CN 9, 10:no dysarthria present  CN 11:symmetric shoulder shrug  CN 12:tongue is midline    Motor:  Bulk, Tone: normal bulk, normal tone  Strength: Symmetric strength of the arms and legs, no lateralizing weakness   Abnormal movements: no abnormal movements are present    Sensory:  Lighttouch: intact in all limbs    Coordination:  FNF:FNF bilaterally intact  JOHN:intact  FFM:intact      [] I personally reviewed the labs/radiological images/ EEG tracing  Labs:  Component      Latest Ref Rng & Units 12/19/2022   WBC      4 31 - 10 16 Thousand/uL 6 84   Red Blood Cell Count      3 81 - 5 12 Million/uL 4 51   Hemoglobin      11 5 - 15 4 g/dL 13 5   HCT      34 8 - 46 1 % 40 5   Platelet Count      812 - 390 Thousands/uL 335   Sodium      135 - 147 mmol/L 138   Potassium      3 5 - 5 3 mmol/L 4 3   Chloride      96 - 108 mmol/L 108   CO2      21 - 32 mmol/L 26   Anion Gap      4 - 13 mmol/L 4   BUN      5 - 25 mg/dL 11   Creatinine      0 60 - 1 30 mg/dL 0 80   Glucose, Random      65 - 140 mg/dL 90   Calcium      8 3 - 10 1 mg/dL 9 5   AST      5 - 45 U/L 17   ALT      12 - 78 U/L 29   Alkaline Phosphatase      46 - 116 U/L 58   Total Protein      6 4 - 8 4 g/dL 7 9   Albumin      3 5 - 5 0 g/dL 3 9   TOTAL BILIRUBIN      0 20 - 1 00 mg/dL 0 47   eGFR      ml/min/1 73sq m 106   PREGNANCY, SERUM      Negative Negative     Continuous video EEG 12/19 to 12/20/2022 reviewed to 10AM:  There were two similar events at 13:31 and 21:08; both times she had tunnel vision, then she goes flaccid in tone (falls backward on to the bed with one of them), she is unresponsive, eyes are intermittently fluttering, with intermittent mild upper body and head jerking activity  She recovers after a few minutes but has a stuttering speech and communicates by sign language  These events are psychogenic nonepileptic events  There are no epileptiform discharges  There is bilateral left more than right sharply contoured theta-alpha activity over the temporal regions  Otherwise normal organization  [x]  The patient is having medically uncontrolled seizures or clinical events, which poses a significant mortality and morbidity risk  Continuous monitoring will aid in diagnosis and determining best treatment options  If seizures are left untreated, there is a 10-20% annual morbidity and 2% annual mortality rate    Continuous monitoring is required to capture a sufficient number of clinical events or seizures, while medication are reduced or provoked, which increases the risk of severe seizures and at risk of seizures causing injury or status epilepticus  The EMU is set up to monitor and record EEGs during seizures and it includes nurses trained to provide first aid and patient safety during seizures  The full hospital team must be present to deal with these medical emergency situations should they arise, including nurses to document patient’s exam  There have been 13 reported deaths during various video-EEG monitoring procedures, so this is not a "low risk" service  The total amount of time spent with the patient / floor unit time was 35 minutes  More than 50% of this time was devoted to counseling and coordination of care  Issues addressed during this clinic visit included overall management, medication counseling or monitoring (including adverse effects, side effects and risks of antiepileptic medications)     Start time: 10:25AM  End time: 11:00AM

## 2022-12-20 NOTE — UTILIZATION REVIEW
Initial Clinical Review    Admission: Date/Time/Statement:   Admission Orders (From admission, onward)     Ordered        12/19/22 0909  Inpatient Admission  Once                      Orders Placed This Encounter   Procedures   • Inpatient Admission     Standing Status:   Standing     Number of Occurrences:   1     Order Specific Question:   Level of Care     Answer:   Level 2 Stepdown / HOT [14]     Order Specific Question:   Estimated length of stay     Answer:   More than 2 Midnights     Order Specific Question:   Certification     Answer:   I certify that inpatient services are medically necessary for this patient for a duration of greater than two midnights  See H&P and MD Progress Notes for additional information about the patient's course of treatment  Initial Presentation: 23 y o  female who presented self from home to [address] [place] [address] as a direct admission  Inpatient admission under epilepsy/neurology for evaluation and treatment of seizure-like activity  PMHx: asthma, GERD, seizures, Tourette's  Presented for evaluation of recurrent episodes w/ seizure-like activity involving loss of consciousness w/ upper body twitching, jerking, and muscle spasms  Had an outpatient EEG study that showed abnormal bilateral frontotemporal theta activity  On exam, unremarkable  Plan: continuous vEEG, seizure precautions, IV Ativan for generalized convulsions, supportive care  Date: 12/20/22   Day 2: Yesterday, there were two typical events of loss of consciousness, impaired speech, and body jerking activity  She recalled that she had tunnel vision with these events and difficulty with her concentration  Physical exam unremarkable  Plan: continue continuous vEEG monitoring to see if the abnormal dysrhythmic activity over temporal regions evolves into seizures, seizure precautions, continue supportive care        Wt Readings from Last 1 Encounters:   12/19/22 88 9 kg (195 lb 15 8 oz) Vital Signs:    Date/Time Temp Pulse Resp BP MAP (mmHg) SpO2   12/20/22 07:24:56 97 3 °F (36 3 °C) Abnormal  72 16 116/74 88 96 %   12/19/22 22:58:07 97 5 °F (36 4 °C) 70 16 116/80 92 97 %   12/19/22 2112 -- 83 -- 132/81 98 98 %   12/19/22 19:37:09 -- 87 -- 129/81 97 98 %   12/19/22 15:29:38 98 3 °F (36 8 °C) 71 -- 114/76 89 98 %   12/19/22 13:34:51 -- 84 -- 146/79 101 98 %   12/19/22 09:08:31 97 3 °F (36 3 °C) Abnormal  84 16 117/77 90 96 %     Pertinent Labs/Diagnostic Test Results:   Results from last 7 days   Lab Units 12/19/22  1139   WBC Thousand/uL 6 84   HEMOGLOBIN g/dL 13 5   HEMATOCRIT % 40 5   PLATELETS Thousands/uL 335   NEUTROS ABS Thousands/µL 4 83         Results from last 7 days   Lab Units 12/19/22  1139   SODIUM mmol/L 138   POTASSIUM mmol/L 4 3   CHLORIDE mmol/L 108   CO2 mmol/L 26   ANION GAP mmol/L 4   BUN mg/dL 11   CREATININE mg/dL 0 80   EGFR ml/min/1 73sq m 106   CALCIUM mg/dL 9 5     Results from last 7 days   Lab Units 12/19/22  1139   AST U/L 17   ALT U/L 29   ALK PHOS U/L 58   TOTAL PROTEIN g/dL 7 9   ALBUMIN g/dL 3 9   TOTAL BILIRUBIN mg/dL 0 47         Results from last 7 days   Lab Units 12/19/22  1139   GLUCOSE RANDOM mg/dL 90         Past Medical History:   Diagnosis Date   • Allergic rhinitis    • Childhood asthma    • Eczema    • Folliculitis    • Functional murmur    • GERD (gastroesophageal reflux disease)    • Parapsoriasis    • Seizures ([facility] [address] )     [facility] 2018 - PNES   • Tourette's      Present on Admission:  • Seizure-like activity ([facility] [address] )  • OCD (obsessive compulsive disorder)  • Generalized anxiety disorder  • Tourette syndrome      Admitting Diagnosis: Seizure-like activity ([facility] [address] ) [R56 9]  Age/Sex: 23 y o  female  Admission Orders:  Regular Diet  Seizure precautions  SCDs  Continuous vEEG monitoring      Scheduled Medications: none    Continuous IV Infusions: none    PRN Meds:  Ibuprofen-Acetaminophen, 2 tablet, Oral, Q8H PRN; 12/19 x1  Influenza vaccine, 0 5 mL, IM; 12/19 x1  LORazepam, 2 mg, Intravenous, Q8H PRN  ondansetron, 4 mg, Intravenous, Q6H PRN        Network Utilization Review Department  ATTENTION: Please call with any questions or concerns to [phone] and carefully listen to the prompts so that you are directed to the right person  All voicemails are confidential   Riley Stearns all requests for admission clinical reviews, approved or denied determinations and any other requests to dedicated fax number below belonging to the campus where the patient is receiving treatment   List of dedicated fax numbers for the Facilities:  [address] DENIALS (Administrative/Medical Necessity) [phone]   [address] (Maternity/NICU/Pediatrics) [phone]   [address] Hanh Spencer [phone]   [facility] [place]  [phone]   [address] [address] Yuli Medina [facility] 28 [phone]   1552 [facility] Judd Jonas [facility] 134 815 [facility] [phone]

## 2022-12-20 NOTE — PLAN OF CARE
Problem: PAIN - ADULT  Goal: Verbalizes/displays adequate comfort level or baseline comfort level  Description: Interventions:  - Encourage patient to monitor pain and request assistance  - Assess pain using appropriate pain scale  - Administer analgesics based on type and severity of pain and evaluate response  - Implement non-pharmacological measures as appropriate and evaluate response  - Consider cultural and social influences on pain and pain management  - Notify physician/advanced practitioner if interventions unsuccessful or patient reports new pain  Outcome: Progressing     Problem: INFECTION - ADULT  Goal: Absence or prevention of progression during hospitalization  Description: INTERVENTIONS:  - Assess and monitor for signs and symptoms of infection  - Monitor lab/diagnostic results  - Monitor all insertion sites, i e  indwelling lines, tubes, and drains  - Monitor endotracheal if appropriate and nasal secretions for changes in amount and color  - [place] appropriate cooling/warming therapies per order  - Administer medications as ordered  - Instruct and encourage patient and family to use good hand hygiene technique  - Identify and instruct in appropriate isolation precautions for identified infection/condition  Outcome: Progressing  Goal: Absence of fever/infection during neutropenic period  Description: INTERVENTIONS:  - Monitor WBC    Outcome: Progressing     Problem: SAFETY ADULT  Goal: Patient will remain free of falls  Description: INTERVENTIONS:  - Educate patient/family on patient safety including physical limitations  - Instruct patient to call for assistance with activity   - Consult OT/PT to assist with strengthening/mobility   - Keep Call bell within reach  - Keep bed low and locked with side rails adjusted as appropriate  - Keep care items and personal belongings within reach  - Initiate and maintain comfort rounds  - Make Fall Risk Sign visible to staff  - Offer Toileting every 2 Hours, in advance of need  - Initiate/Maintain bed alarm  - Obtain necessary fall risk management equipment: staff assist oob for EMU  - Apply yellow socks and bracelet for high fall risk patients  - Consider moving patient to room near nurses station  Outcome: Progressing  Goal: Maintain or return to baseline ADL function  Description: INTERVENTIONS:  -  Assess patient's ability to carry out ADLs; assess patient's baseline for ADL function and identify physical deficits which impact ability to perform ADLs (bathing, care of mouth/teeth, toileting, grooming, dressing, etc )  - Assess/evaluate cause of self-care deficits   - Assess range of motion  - Assess patient's mobility; develop plan if impaired  - Assess patient's need for assistive devices and provide as appropriate  - Encourage maximum independence but intervene and supervise when necessary  - Involve family in performance of ADLs  - Assess for home care needs following discharge   - Consider OT consult to assist with ADL evaluation and planning for discharge  - Provide patient education as appropriate  Outcome: Progressing  Goal: Maintains/Returns to pre admission functional level  Description: INTERVENTIONS:  - Perform BMAT or MOVE assessment daily    - Set and communicate daily mobility goal to care team and patient/family/caregiver  - Collaborate with rehabilitation services on mobility goals if consulted  - Perform Range of Motion 3 times a day    - Dangle patient 3 times a day  - Stand patient 3 times a day  - Ambulate patient 3 times a day  - Out of bed to chair 3 times a day   - Out of bed for meals 3 times a day  - Out of bed for toileting  - Record patient progress and toleration of activity level   Outcome: Progressing     Problem: DISCHARGE PLANNING  Goal: Discharge to home or other facility with appropriate resources  Description: INTERVENTIONS:  - Identify barriers to discharge w/patient and caregiver  - Arrange for needed discharge resources and transportation as appropriate  - Identify discharge learning needs (meds, wound care, etc )  - Arrange for interpretive services to assist at discharge as needed  - Refer to Case Management Department for coordinating discharge planning if the patient needs post-hospital services based on physician/advanced practitioner order or complex needs related to functional status, cognitive ability, or social support system  Outcome: Progressing     Problem: Knowledge Deficit  Goal: Patient/family/caregiver demonstrates understanding of disease process, treatment plan, medications, and discharge instructions  Description: Complete learning assessment and assess knowledge base    Interventions:  - Provide teaching at level of understanding  - Provide teaching via preferred learning methods  Outcome: Progressing     Problem: NEUROSENSORY - ADULT  Goal: Achieves stable or improved neurological status  Description: INTERVENTIONS  - Monitor and report changes in neurological status  - Monitor vital signs such as temperature, blood pressure, glucose, and any other labs ordered   - Initiate measures to prevent increased intracranial pressure  - Monitor for seizure activity and implement precautions if appropriate      Outcome: Progressing  Goal: Remains free of injury related to seizures activity  Description: INTERVENTIONS  - Maintain airway, patient safety  and administer oxygen as ordered  - Monitor patient for seizure activity, document and report duration and description of seizure to physician/advanced practitioner  - If seizure occurs,  ensure patient safety during seizure  - Reorient patient post seizure  - Seizure pads on all 4 side rails  - Instruct patient/family to notify RN of any seizure activity including if an aura is experienced  - Instruct patient/family to call for assistance with activity based on nursing assessment  - Administer anti-seizure medications if ordered    Outcome: Progressing  Goal: Achieves maximal functionality and self care  Description: INTERVENTIONS  - Monitor swallowing and airway patency with patient fatigue and changes in neurological status  - Encourage and assist patient to increase activity and self care     - Encourage visually impaired, hearing impaired and aphasic patients to use assistive/communication devices  Outcome: Progressing

## 2022-12-20 NOTE — CASE MANAGEMENT
Case Management Assessment & Discharge Planning Note    Patient name Magy Goddard  Location [address] [place] [address] 715/PPHP [id number] MRN [id number]  : 2000 Date 2022       Current Admission Date: 2022  Current Admission Diagnosis:Seizure-like activity [facility])   Patient Active Problem List    Diagnosis Date Noted   • Seizure-like activity ([address] ) 10/14/2022   • Binge eating disorder 2022   • PCOS (polycystic ovarian syndrome) 2019   • PMDD (premenstrual dysphoric disorder) 2019   • Elevated testosterone level in female 2018   • Depression 2018   • Irregular menses 06/15/2017   • Atopic dermatitis 05/15/2017   • Tourette syndrome 2016   • Jerky body movements 2015   • Abnormal EEG 2015   • Dyshidrotic eczema 2015   • Migraine headache 10/01/2014   • Myopia 10/01/2014   • Generalized anxiety disorder 2014   • Psoriasis 2014   • OCD (obsessive compulsive disorder) 2011      LOS (days): 1  Geometric Mean LOS (GMLOS) (days):   Days to GMLOS:     OBJECTIVE:    Risk of Unplanned Readmission Score: 3 93         Current admission status: Inpatient  Referral Reason: Other (Pt not referred and does not meet screening criteria)    Preferred Pharmacy:   [id number], [address] [address]  Phone: [phone] Fax: [phone]    [id number] [place], [address] [facility] [address]   [address] [address]  Phone: [phone] Fax: [phone]    [id number] [address]  [address] [address]  Phone: [phone] Fax: [phone]    [id number], [address] [place] [address] [address] [place] [address]  Phone: [phone] Fax: [phone]    Primary Care Provider: No primary care provider on file      Primary Insurance: BLUE CROSS  Secondary Insurance:     ASSESSMENT:  Active Health Care Proxies    There are no active Health Care Proxies on file  Readmission Root Cause  30 Day Readmission: No    Patient Information  Admitted from[de-identified] Home  Mental Status: Alert         DISCHARGE DETAILS:       Additional Comments: CM chart reviewed and is aware the pt is under epileptology for medical managment  It is anticipated the pt will be able to return home without formal service referrals once cleared for hospital discharge  CM will continue to follow for post acute care planning needs

## 2022-12-20 NOTE — PLAN OF CARE
Problem: PAIN - ADULT  Goal: Verbalizes/displays adequate comfort level or baseline comfort level  Description: Interventions:  - Encourage patient to monitor pain and request assistance  - Assess pain using appropriate pain scale  - Administer analgesics based on type and severity of pain and evaluate response  - Implement non-pharmacological measures as appropriate and evaluate response  - Consider cultural and social influences on pain and pain management  - Notify physician/advanced practitioner if interventions unsuccessful or patient reports new pain  Outcome: Progressing     Problem: INFECTION - ADULT  Goal: Absence or prevention of progression during hospitalization  Description: INTERVENTIONS:  - Assess and monitor for signs and symptoms of infection  - Monitor lab/diagnostic results  - Monitor all insertion sites, i e  indwelling lines, tubes, and drains  - Monitor endotracheal if appropriate and nasal secretions for changes in amount and color  - [place] appropriate cooling/warming therapies per order  - Administer medications as ordered  - Instruct and encourage patient and family to use good hand hygiene technique  - Identify and instruct in appropriate isolation precautions for identified infection/condition  Outcome: Progressing  Goal: Absence of fever/infection during neutropenic period  Description: INTERVENTIONS:  - Monitor WBC    Outcome: Progressing     Problem: SAFETY ADULT  Goal: Patient will remain free of falls  Description: INTERVENTIONS:  - Educate patient/family on patient safety including physical limitations  - Instruct patient to call for assistance with activity   - Consult OT/PT to assist with strengthening/mobility   - Keep Call bell within reach  - Keep bed low and locked with side rails adjusted as appropriate  - Keep care items and personal belongings within reach  - Initiate and maintain comfort rounds  - Make Fall Risk Sign visible to staff  - Offer Toileting every 2 Hours, in advance of need  - Initiate/Maintain bed alarm    - Apply yellow socks and bracelet for high fall risk patients  - Consider moving patient to room near nurses station  Outcome: Progressing  Goal: Maintain or return to baseline ADL function  Description: INTERVENTIONS:  -  Assess patient's ability to carry out ADLs; assess patient's baseline for ADL function and identify physical deficits which impact ability to perform ADLs (bathing, care of mouth/teeth, toileting, grooming, dressing, etc )  - Assess/evaluate cause of self-care deficits   - Assess range of motion  - Assess patient's mobility; develop plan if impaired  - Assess patient's need for assistive devices and provide as appropriate  - Encourage maximum independence but intervene and supervise when necessary  - Involve family in performance of ADLs  - Assess for home care needs following discharge   - Consider OT consult to assist with ADL evaluation and planning for discharge  - Provide patient education as appropriate  Outcome: Progressing  Goal: Maintains/Returns to pre admission functional level  Description: INTERVENTIONS:  - Perform BMAT or MOVE assessment daily    - Set and communicate daily mobility goal to care team and patient/family/caregiver  - Collaborate with rehabilitation services on mobility goals if consulted  - Perform Range of Motion 3 times a day  - Reposition patient every 2 hours    - Dangle patient 3 times a day  - Stand patient 3 times a day  - Ambulate patient 3 times a day  - Out of bed to chair 3 times a day   - Out of bed for meals 3 times a day  - Out of bed for toileting  - Record patient progress and toleration of activity level   Outcome: Progressing     Problem: DISCHARGE PLANNING  Goal: Discharge to home or other facility with appropriate resources  Description: INTERVENTIONS:  - Identify barriers to discharge w/patient and caregiver  - Arrange for needed discharge resources and transportation as appropriate  - Identify discharge learning needs (meds, wound care, etc )  - Arrange for interpretive services to assist at discharge as needed  - Refer to Case Management Department for coordinating discharge planning if the patient needs post-hospital services based on physician/advanced practitioner order or complex needs related to functional status, cognitive ability, or social support system  Outcome: Progressing     Problem: Knowledge Deficit  Goal: Patient/family/caregiver demonstrates understanding of disease process, treatment plan, medications, and discharge instructions  Description: Complete learning assessment and assess knowledge base    Interventions:  - Provide teaching at level of understanding  - Provide teaching via preferred learning methods  Outcome: Progressing     Problem: NEUROSENSORY - ADULT  Goal: Achieves stable or improved neurological status  Description: INTERVENTIONS  - Monitor and report changes in neurological status  - Monitor vital signs such as temperature, blood pressure, glucose, and any other labs ordered   - Initiate measures to prevent increased intracranial pressure  - Monitor for seizure activity and implement precautions if appropriate      Outcome: Progressing  Goal: Remains free of injury related to seizures activity  Description: INTERVENTIONS  - Maintain airway, patient safety  and administer oxygen as ordered  - Monitor patient for seizure activity, document and report duration and description of seizure to physician/advanced practitioner  - If seizure occurs,  ensure patient safety during seizure  - Reorient patient post seizure  - Seizure pads on all 4 side rails  - Instruct patient/family to notify RN of any seizure activity including if an aura is experienced  - Instruct patient/family to call for assistance with activity based on nursing assessment  - Administer anti-seizure medications if ordered    Outcome: Progressing  Goal: Achieves maximal functionality and self care  Description: INTERVENTIONS  - Monitor swallowing and airway patency with patient fatigue and changes in neurological status  - Encourage and assist patient to increase activity and self care     - Encourage visually impaired, hearing impaired and aphasic patients to use assistive/communication devices  Outcome: Progressing

## 2022-12-21 ENCOUNTER — TELEPHONE (OUTPATIENT)
Dept: PSYCHIATRY | Facility: CLINIC | Age: 22
End: 2022-12-21

## 2022-12-21 VITALS
HEIGHT: 63 IN | WEIGHT: 195.99 LBS | HEART RATE: 81 BPM | RESPIRATION RATE: 16 BRPM | OXYGEN SATURATION: 95 % | TEMPERATURE: 98 F | BODY MASS INDEX: 34.73 KG/M2 | SYSTOLIC BLOOD PRESSURE: 108 MMHG | DIASTOLIC BLOOD PRESSURE: 72 MMHG

## 2022-12-21 PROBLEM — F44.5 PSYCHOGENIC NONEPILEPTIC SEIZURE: Status: ACTIVE | Noted: 2022-10-14

## 2022-12-21 PROBLEM — F44.5 FUNCTIONAL NEUROLOGICAL SYMPTOM DISORDER WITH ATTACKS OR SEIZURES: Status: ACTIVE | Noted: 2022-12-21

## 2022-12-21 NOTE — PLAN OF CARE
Problem: SAFETY ADULT  Goal: Patient will remain free of falls  Description: INTERVENTIONS:  - Educate patient/family on patient safety including physical limitations  - Instruct patient to call for assistance with activity   - Consult OT/PT to assist with strengthening/mobility   - Keep Call bell within reach  - Keep bed low and locked with side rails adjusted as appropriate  - Keep care items and personal belongings within reach  - Initiate and maintain comfort rounds  - Make Fall Risk Sign visible to staff  - Offer Toileting every 2 Hours, in advance of need  - Initiate/Maintain bed alarm  - Obtain necessary fall risk management equipment: staff assist oob for EMU  - Apply yellow socks and bracelet for high fall risk patients  - Consider moving patient to room near nurses station  Outcome: Progressing  Goal: Maintain or return to baseline ADL function  Description: INTERVENTIONS:  -  Assess patient's ability to carry out ADLs; assess patient's baseline for ADL function and identify physical deficits which impact ability to perform ADLs (bathing, care of mouth/teeth, toileting, grooming, dressing, etc )  - Assess/evaluate cause of self-care deficits   - Assess range of motion  - Assess patient's mobility; develop plan if impaired  - Assess patient's need for assistive devices and provide as appropriate  - Encourage maximum independence but intervene and supervise when necessary  - Involve family in performance of ADLs  - Assess for home care needs following discharge   - Consider OT consult to assist with ADL evaluation and planning for discharge  - Provide patient education as appropriate  Outcome: Progressing  Goal: Maintains/Returns to pre admission functional level  Description: INTERVENTIONS:  - Perform BMAT or MOVE assessment daily    - Set and communicate daily mobility goal to care team and patient/family/caregiver     - Collaborate with rehabilitation services on mobility goals if consulted  - Perform Range of Motion 3 times a day    - Dangle patient 3 times a day  - Stand patient 3 times a day  - Ambulate patient 3 times a day  - Out of bed to chair 3 times a day   - Out of bed for meals 3 times a day  - Out of bed for toileting  - Record patient progress and toleration of activity level   Outcome: Progressing

## 2022-12-21 NOTE — DISCHARGE INSTR - AVS FIRST PAGE
You were admitted to the Epilepsy Monitoring Unit from 12/19-12/21/2022  During this admission you had five of your typical events  These started with tunnel vision, cognitive difficulty, then you lose muscle tone (slump forward or fall backwards into bed), you are unresponsive to questions, eyes appear to be fluttering, you seem to struggle with breathing and there are subtle upper body jerks (shoulder and head region)  The events end with you taking a gasp of air, stuttering speech, but able to communicate in sign language  During this time your EEG shows an awake background  There is no evidence (EEG or on video) that these are epileptic seizures  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)  These events are not due to abnormal electrical storming of your brain  These are not treated by epilepsy medications  These events are psychological in origin and are treated with psychotherapy or cognitive behavioral therapy  Dr Leo Marie had discussed with the patient that these events of loss of consciousness, body jerking, and difficulty breathing are most consistent with nonepileptic psychogenic attacks  These are otherwise known as pseudoseizures, nonepileptic attacks, or stress spells  Dr Leo Marie reviewed that epileptic seizures are due to abnormal synchronous neuronal activity of the brain causing symptoms of altered awareness, loss of consciousness focal motor activity and/or convulsive actions  Unlike epileptic seizures, nonepileptic psychogenic attacks are not associated with synchronous neuronal activity of the brain  The exact mechanism of nonepileptic psychogenic attacks are unknown; however, typically these events are strongly correlated with underlying psychiatric conditions such as depression, anxiety, posttraumatic stress disorder, family conflict, or emotional distress   These attacks are consider be a form of defense mechanism under somatoform disorders, conversion disorder, or dissociative disorder  It is recommended that the patient is evaluated by a behavioral health specialist including psychiatrist, psychologist, and license counselors  Psychotherapy as the form of treatment including cognitive behavioral therapy, relaxation training, and biofeedback training  The patient appears to understand that these events are not epileptic seizures and are willing to be evaluated by a behavioral health specialist     Resources:  IndividualReport nl  org  https://nonepilepticseizures  com     Non-Epileptic Psychogenic Spells (NEPS)  What are non-epileptic psychogenic spells? Non-epileptic psychogenic spells (NEPS), sometimes called non-epileptic attack, nervous spells, pseudo-seizures, or stress spells, are behavioral events that look like epileptic seizures to an observer, but are not actually epileptic seizures  What is the difference between epileptic seizures and NEPS? Epileptic seizures can be described as an electrical storm in the brain  The electrical storm causes the symptoms of the seizure (shaking, unresponsiveness, etc) and shows up as abnormal changes in brain waves on EEG monitoring  Non-epileptic psychogenic spells (NEPS) are not due to an electrical storm in the brain and brain waves on EEG monitoring remain normal during NEPS  They are still real, uncontrollable events that can cause a person to be unresponsive and sometimes shake, but they are not caused by abnormal electrical signals in the brain  How is the diagnosis of NEPS made? In most cases, the events must be captured in the act on EEG while patients are monitored on video-EEG, like in an Epilepsy Monitoring Unit  This allows doctors to see both what the patient is doing during the event and monitor the brain waves to look for the presence or absence of abnormal brain waves (the electrical storm)  Why is it important to diagnose NEPS?    The treatment for epileptic seizures and non-epileptic psychogenic spells are very different  Seizure medications are designed to treat electrical storms of the brain, and don’t work in [employer] because an electrical storm is not the problem  By confirming the diagnosis of NEPS, patients can avoid unnecessary medications or procedures and focus on treatments specific for NEPS with a mental health provider  How common are NEPS? The exact prevalence of NEPS in the population is not known, but a third to a half of patients seen in Epilepsy monitoring units are diagnosed with NEPS  What causes NEPS? Sometimes the exact cause of NEPS is difficult to determine  Common causes are depression, anxiety, post-traumatic stress disorder, family conflict, or a history of abuse  Talking with a mental health provider is the best way to try to identify the cause  What if an underlying cause can’t be found? Counseling from a certified counselor, psychologist or psychiatrist is helpful even if a specific cause of NEPS cannot be found  Mental health professionals can teach patients techniques for dealing with the spells  What are the treatment options for NEPS? Treatment begins with evaluation by a mental health specialist, including psychologists, licensed counselors, and psychiatrists  They will work to identify a cause if possible, and then focus treatments for that cause  Remember, often, no clear cause can be found  Working with a mental health provider is still important  Treatments include counseling, medications, and strategies to cope/deal with the spells  Some strategies to cope/deal with the spells include: relaxation training, visualization, biofeedback, and active problem solving skills  Are they faked or done purposely? No  NEPS are not “faked”  What happens during a non-epileptic psychogenic spell is out of the control of the patient  Working with a mental health provider can help get these events back under a person’s control    Can the brain be damaged from NEPS? No  The brain has normal electrical activity during a NEPS  People can be injured if they fall during an event, so diagnosis and correct treatment are very important  Can NEPS affect a person’s activities of daily living? Even though non-epileptic psychogenic spells are not epileptic seizures, the spells can affect people’s daily lives  When a spell occurs, patients should stop what you are doing until the spell passes  After the spell stops, he/she can then resume their regular activities as soon as they are able  Can people with epilepsy also have NEPS? Yes  It is possible for people with epilepsy to also have NEPS  EEG monitoring can determine which spells are epileptic seizures and which are NEPS  Where can I find a psychologist?   Often, your primary care doctor or the doctors in the Epilepsy monitoring unit can help direct you to a mental health provider  The American Psychological Association (APA) has a “Psychologist ” (http://  apa org/)  Enter your zip code and “dissociative disorders” in the area of specialization field to find the names and contact information of some mental health professionals in your area  What should people do when I have a nonepileptic attack? It is often difficult to distinguish between an epileptic seizure and nonepileptic attack, unless your family has been instructed on recognizing seizures and nonepileptic attacks  It is recommended that you have your family member, friends, or colleagues available at your doctor's appointment to review what are seizures and what are your nonepileptic attacks (this may include a review of a video of your nonepileptic attack)  First thing is to stay calm and not panic  Make sure that the person having the seizure or attack is safe and remove dangerous objects  You may even place a pillow of soft clothing under their head  Speak calmly to the person having a seizure or attack  Non-epileptic attacks may stop if the person is reassured that they are safe and the attack will end  Remember that non-epileptic attacks do not cause damage to the brain and may go on for several minutes  Sometimes nonepileptic attacks last longer than epileptic seizures  If you are not sure if the person is having a nonepileptic attack or epileptic seizure for more than 5 minutes, you may call for an ambulance but inform the emergency medical technician that of the person's medical diagnosis of epilepsy and / or nonepileptic attacks  Do not hold the person down during the seizure or nonepileptic attack  Do not give the person medication or food or place any object into their mouth during the seizure or nonepileptic attack  For more information about NEPS, you can visit the following website:  [employer]  com  IndividualReport nl  org  Pushpa hernandez  info  Book with information and tools for management of NEPS:   Carrie Rivera  (2014) Psychogenic Non-epileptic Seizures: A Guide   [address]

## 2022-12-21 NOTE — DISCHARGE SUMMARY
EPILEPSY ATTENDING DISCHARGE SUMMARY    Patient Name: Yvonne Lam  YOB: 2000  MRN: [id number]  Acct/CSN Number: [de-identified]  Date of Admission:   12/19/2022  Date of Discharge: 12/21/22  Attending on Admission: Briseyda Salazar MD PhD   Attending on Discharge: Briseyda Salazar MD PhD     Admission Diagnosis:    1  Spells/Fit NOS, Seizures R56 9    Discharge Diagnosis:  1  Functional neurological symptoms disorder, with seizure F44 5    Secondary Diagnoses:  1  Obsessive compulsive disorder  2  Tics    Procedures:  Continuous Video EEG monitoring    Medication Changes:  None    Discharge Medications:  Nuvaring  Ibuprofen-acetaminophen 125mg-250mg take 1-2 tabs PRN pain    Brief History:  Ms Lazaro Newman started to have seizure-like events in 2018  These events involved loss of consciousness without convulsive activity  She was told that these were PNES and that she needed counseling  She did cognitive behavioral therapy and these events persisted  She then had an evaluation for cataplexy/narcolepsy but was told that she had hypersomnolence  Over the past year, these events have evolved to involve more features and are longer in duration  She has 2-3 events a day, essentially every day  She may have a change in speech pattern (word finding difficulty, slurred speech, or slow speech), then she feels that she is unable to respond or paralyzed, then she looks like she is staring, there are muscle spasms or upper body (head shoulder) jerking activity, she feels that she cannot breath, when it ends she takes a big gasp of air  She may be in and out of consciousness with these events  There is no movement of her arms or legs  Afterwards she has a severe headache or muscle pain in her neck and shoulders  Triggers are flashing lights, rapid temperature changes, or too much auditory stimuli  She was told that she had abnormal EEGs      Hospital Course:  Yvonne Lam was admitted to the epilepsy monitoring unit   Patient had 2 days of continuous EEG monitoring  During this time there were five stereotypical events of her feeling a sensation (sometimes tunnel vision), followed by her going limp, seemingly losing consciousness, eyes fluttering, unresponsive, upper body (head and shoulder) jerking (she reports that she was having difficulty breathing), then a sudden gasp of air  Immediately after the event, she has impaired speech, stuttering, but is able to communicate by sign language  One event was triggered by photic stimulation  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  Dr Gallito Cardona reviewed the diagnosis of PNES with the patient  She reports no other clinical events to be captured (there is no history of generalized bilateral shaking or tonic clonic activity)  EMU CONCLUSION  Summary interictal findings:  Normal awake and asleep background organization  No epileptiform discharges  There are sharply contoured alpha-theta activity over the bilateral frontotemporal region (or temporal region), left more than right (this is a finding of unknown clinical significance; likely a benign variant)    Summary event findings: There were five clinical events  All of the events occur when the patient is awake, no electrographic seizures, no epileptiform discharges, and no rhythmic ictal activity  On day 1 - there were two events that start with "tunnel vision", difficulty with her thoughts, concentration is off, then she starts with eye fluttering, then slumps backwards or is flaccid in muscle tone, she has noncontinuous shoulder-head jerking, she is unresponsive, ends with a dramatic gasp of air  On day 2 -  There were three events  All are similar, the first event was triggered during photic stimulation    All events again had similar features of her losing muscle tone (slumps over), flaccid tone, eyes are fluttering, upper body-head jerking, unresponsiveness, then recovers with a dramatic gasp of air  Events last about 5 minutes on average  The post-ictal period has her stuttering and she communicates by sign language for a short period of time  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)  Epilepsy Classification: No epilepsy    EMU findings and discussion:  Dr Tasha Montiel had discussed with the patient that the events are nonepileptic psychogenic seizures / attacks  These are otherwise known as pseudoseizures, nonepileptic attacks, or stress spells  Dr Tasha Montiel reviewed that epileptic seizures are due to abnormal synchronous neuronal activity of the brain causing symptoms of altered awareness, loss of consciousness focal motor activity and/or convulsive actions  Unlike epileptic seizures, nonepileptic psychogenic attacks are not associated with synchronous neuronal activity of the brain  The exact mechanism of nonepileptic psychogenic attacks are unknown; however, typically these events are strongly correlated with underlying psychiatric conditions such as depression, anxiety, posttraumatic stress disorder, family conflict, or emotional distress  These attacks are consider be a form of defense mechanism under somatoform disorders, conversion disorder, or dissociative disorder  It is recommended that the patient is evaluated by a behavioral health specialist including psychiatrist, psychologist, and license counselors  Psychotherapy as the form of treatment including cognitive behavioral therapy, relaxation training, and biofeedback training  She reports that she has gone through CBT in the past but without improvement; symptoms got worse  She reports that she has no recollection of her childhood prior to the age of 17  She denies a past exposure to trauma, such as physical or sexual abuse  Although she was told that she had the markers of PTSD    She currently does not have a therapist   She does see a psychiatrist but is not on any medication to manage her OCD or depressive symptoms  She was given a referral to a behavioral health therapist (she will need to contact her insurance to find out who is in her network)  Dr Haley Rueda also reviewed that the "abnormal" findings of sharply contoured theta-alpha activity over the left more than right temporal regions are likely benign variants, finding of unknown clinical significance and does not increase the risk of epilepsy  Condition at Discharge: stable     Discharge instructions/Information to patient and family:   See after visit summary for information provided to patient and family  Provisions for Follow-Up Care:  See after visit summary for information related to follow-up care and any pertinent home health orders  Disposition: Home    Discharge Statement:  I spent 40 minutes discharging the patient (Start:  9:35AM; Stop:  10:15AM), greater than 50% of total time spent counseling the patient, coordination of care, providing post-discharge instructions and follow-up  This time was spent on the day of discharge  I had direct contact with the patient on the day of discharge  Additional time then spent on discharge activities

## 2022-12-21 NOTE — TELEPHONE ENCOUNTER
Contacted Patient in regards to routine referral, after chart review patient was recently in the ED  Lvm for patient to contact intake department

## 2022-12-22 NOTE — UTILIZATION REVIEW
NOTIFICATION OF ADMISSION DISCHARGE   This is a Notification of Discharge from [address]  Please be advised that this patient has been discharge from our facility  Below you will find the admission and discharge date and time including the patient’s disposition  UTILIZATION REVIEW CONTACT:  Daphney Rodas  Utilization   Network Utilization Review Department  Phone: [phone] x carefully listen to the prompts  All voicemails are confidential   Email: [email or web address]     ADMISSION INFORMATION  PRESENTATION DATE: 12/19/2022  8:54 AM  OBERVATION ADMISSION DATE:   INPATIENT ADMISSION DATE: 12/19/22  8:54 AM   DISCHARGE DATE: 12/21/2022  9:52 AM   DISPOSITION:Home/Self Care    IMPORTANT INFORMATION:  Send all requests for admission clinical reviews, approved or denied determinations and any other requests to dedicated fax number below belonging to the campus where the patient is receiving treatment   List of dedicated fax numbers:  [address] DENIALS (Administrative/Medical Necessity) [phone]   [address] (Maternity/NICU/Pediatrics) [phone]   [facility] [phone]   [facility] 87 [phone]   [address] [phone]   [address] [facility] [phone]   90 [facility] [phone]   [address] [place], [place] [address] [address] [phone]   [facility]  [phone]   [address] [facility] [phone]   [address] [facility] [address] [facility] [phone]

## 2022-12-27 NOTE — TELEPHONE ENCOUNTER
Spoke to pt regarding routine referral  Pt stated that she is not interested in services  Referral closed  91

## 2023-02-01 ENCOUNTER — TELEPHONE (OUTPATIENT)
Dept: NEUROLOGY | Facility: CLINIC | Age: 23
End: 2023-02-01